# Patient Record
Sex: MALE | Race: WHITE | NOT HISPANIC OR LATINO | Employment: FULL TIME | URBAN - METROPOLITAN AREA
[De-identification: names, ages, dates, MRNs, and addresses within clinical notes are randomized per-mention and may not be internally consistent; named-entity substitution may affect disease eponyms.]

---

## 2017-04-14 ENCOUNTER — OFFICE VISIT (OUTPATIENT)
Dept: FAMILY MEDICINE CLINIC | Facility: CLINIC | Age: 32
End: 2017-04-14

## 2017-04-14 VITALS
TEMPERATURE: 98.2 F | OXYGEN SATURATION: 97 % | HEART RATE: 73 BPM | BODY MASS INDEX: 35.12 KG/M2 | SYSTOLIC BLOOD PRESSURE: 124 MMHG | RESPIRATION RATE: 16 BRPM | WEIGHT: 186 LBS | HEIGHT: 61 IN | DIASTOLIC BLOOD PRESSURE: 92 MMHG

## 2017-04-14 DIAGNOSIS — I10 ESSENTIAL HYPERTENSION: Primary | ICD-10-CM

## 2017-04-14 DIAGNOSIS — Z00.00 LABORATORY EXAMINATION ORDERED AS PART OF A ROUTINE GENERAL MEDICAL EXAMINATION: ICD-10-CM

## 2017-04-14 PROCEDURE — 99213 OFFICE O/P EST LOW 20 MIN: CPT | Performed by: PHYSICIAN ASSISTANT

## 2017-04-14 RX ORDER — RAMIPRIL 5 MG/1
5 CAPSULE ORAL DAILY
Qty: 90 CAPSULE | Refills: 0 | Status: SHIPPED | OUTPATIENT
Start: 2017-04-14 | End: 2017-04-14

## 2017-04-14 RX ORDER — RAMIPRIL 2.5 MG/1
2.5 CAPSULE ORAL DAILY
Qty: 90 CAPSULE | Refills: 0 | Status: SHIPPED | OUTPATIENT
Start: 2017-04-14

## 2017-04-14 NOTE — PROGRESS NOTES
Subjective   Eric Lorenzo is a 31 y.o. male.     History of Present Illness   Eric Lorenzo 31 y.o. male who presents today for routine follow up check and medication refills.  he has a history of   Patient Active Problem List   Diagnosis   • Essential hypertension   .  Since the last visit, he has overall felt tired.  He has been getting elevated BP readings for a month.  Home reading 180/105; 160/101;  .  he has been compliant with current medications have reviewed them.  The patient denies medication side effects.    Both parents have HTN  Was on Ramipril in past and bp came down    Last labs were June.  Lab Results   Component Value Date    GLUCOSE 82 07/02/2014    BUN 11 06/28/2016    CREATININE 1.33 (H) 06/28/2016    EGFRIFNONA 64 06/28/2016    EGFRIFAFRI 78 06/28/2016    BCR 8.3 06/28/2016    K 4.6 06/28/2016    CO2 24.2 06/28/2016    CALCIUM 10.3 06/28/2016    PROTENTOTREF 7.6 06/28/2016    ALBUMIN 4.90 06/28/2016    LABIL2 1.8 06/28/2016    AST 22 06/28/2016    ALT 35 06/28/2016     No results found for: CHOL  Lab Results   Component Value Date    TRIG 239 (H) 06/28/2016     Lab Results   Component Value Date    HDL 24 (L) 06/28/2016     No results found for: LDLCALC  Lab Results   Component Value Date    LDL 82 06/28/2016     No results found for: HDLLDLRATIO  No components found for: CHOLHDL    Having h/a's      Weight is down and trying    The following portions of the patient's history were reviewed and updated as appropriate: allergies, current medications, past family history, past medical history, past social history, past surgical history and problem list.    Review of Systems   Constitutional: Positive for fatigue. Negative for activity change, appetite change and unexpected weight change.   HENT: Negative for nosebleeds and trouble swallowing.    Eyes: Negative for pain and visual disturbance.   Respiratory: Negative for chest tightness, shortness of breath and wheezing.    Cardiovascular:  Negative for chest pain and palpitations.   Gastrointestinal: Negative for abdominal pain and blood in stool.   Endocrine: Negative.    Genitourinary: Negative for difficulty urinating and hematuria.   Musculoskeletal: Negative for joint swelling.   Skin: Negative for color change and rash.   Allergic/Immunologic: Negative.    Neurological: Positive for headaches. Negative for syncope and speech difficulty.   Hematological: Negative for adenopathy.   Psychiatric/Behavioral: Negative for agitation and confusion.   All other systems reviewed and are negative.      Objective   Physical Exam   Constitutional: He is oriented to person, place, and time. He appears well-developed and well-nourished. No distress.   HENT:   Head: Normocephalic and atraumatic.   Eyes: Conjunctivae and EOM are normal. Pupils are equal, round, and reactive to light. Right eye exhibits no discharge. Left eye exhibits no discharge. No scleral icterus.   Neck: Normal range of motion. Neck supple. No tracheal deviation present. No thyromegaly present.   Cardiovascular: Normal rate, regular rhythm, normal heart sounds, intact distal pulses and normal pulses.  Exam reveals no gallop.    No murmur heard.  Pulmonary/Chest: Effort normal and breath sounds normal. No respiratory distress. He has no wheezes. He has no rales.   Musculoskeletal: Normal range of motion.   Neurological: He is alert and oriented to person, place, and time. He exhibits normal muscle tone. Coordination normal.   Skin: Skin is warm. No rash noted. No erythema. No pallor.   Psychiatric: He has a normal mood and affect. His behavior is normal. Judgment and thought content normal.   Nursing note and vitals reviewed.      Assessment/Plan   Problems Addressed this Visit        Cardiovascular and Mediastinum    Essential hypertension - Primary    Relevant Medications    ramipril (ALTACE) 5 MG capsule    Other Relevant Orders    Comprehensive metabolic panel    Lipid panel    CBC and  Differential    TSH    Vitamin D 25 Hydroxy    T4, Free    Urinalysis With Microscopic      Other Visit Diagnoses     Laboratory examination ordered as part of a routine general medical examination        Relevant Orders    Comprehensive metabolic panel    Lipid panel    CBC and Differential    TSH    Vitamin D 25 Hydroxy    T4, Free    Urinalysis With Microscopic

## 2017-04-14 NOTE — PATIENT INSTRUCTIONS
Low glycemic index diet  Exercise 30 minutes most days of the week  Make sure you get results on any labs or tests we ordered today  We discussed medications and how to take them as prescribed  Sleep 6-8 hours each night if possible  If you have not signed up for [facility], please activate your code ASAP from your After Visit Summary today    LDL goal <100  LDL goal if heart disease <70  HDL goal >60  Triglyceride goal <150  BP goal =<130/80  Fasting glucose <100    Start Rx at 5mg and watch for low bp as lose more weight  Update fasting labs    90/60s    appt one month  Need to know if still getting >140/90

## 2017-04-17 DIAGNOSIS — F41.9 ANXIETY: Primary | ICD-10-CM

## 2017-05-25 DIAGNOSIS — I10 ESSENTIAL HYPERTENSION: Primary | ICD-10-CM

## 2017-05-25 DIAGNOSIS — R79.89 CREATININE ELEVATION: ICD-10-CM

## 2017-05-25 LAB
25(OH)D3+25(OH)D2 SERPL-MCNC: 22.8 NG/ML (ref 30–100)
ALBUMIN SERPL-MCNC: 4.7 G/DL (ref 3.5–5.2)
ALBUMIN/GLOB SERPL: 2 G/DL
ALP SERPL-CCNC: 91 U/L (ref 39–117)
ALT SERPL-CCNC: 34 U/L (ref 1–41)
APPEARANCE UR: CLEAR
AST SERPL-CCNC: 23 U/L (ref 1–40)
BACTERIA #/AREA URNS HPF: NORMAL /HPF
BASOPHILS # BLD AUTO: 0.09 10*3/MM3 (ref 0–0.2)
BASOPHILS NFR BLD AUTO: 1 % (ref 0–1.5)
BILIRUB SERPL-MCNC: 0.5 MG/DL (ref 0.1–1.2)
BILIRUB UR QL STRIP: NEGATIVE
BUN SERPL-MCNC: 19 MG/DL (ref 6–20)
BUN/CREAT SERPL: 13.6 (ref 7–25)
CALCIUM SERPL-MCNC: 9.5 MG/DL (ref 8.6–10.5)
CASTS URNS MICRO: NORMAL
CHLORIDE SERPL-SCNC: 102 MMOL/L (ref 98–107)
CHOLEST SERPL-MCNC: 139 MG/DL (ref 0–200)
CO2 SERPL-SCNC: 25.2 MMOL/L (ref 22–29)
COLOR UR: YELLOW
CREAT SERPL-MCNC: 1.4 MG/DL (ref 0.76–1.27)
EOSINOPHIL # BLD AUTO: 0.5 10*3/MM3 (ref 0–0.7)
EOSINOPHIL NFR BLD AUTO: 5.4 % (ref 0.3–6.2)
EPI CELLS #/AREA URNS HPF: NORMAL /HPF
ERYTHROCYTE [DISTWIDTH] IN BLOOD BY AUTOMATED COUNT: 12.5 % (ref 11.5–14.5)
GLOBULIN SER CALC-MCNC: 2.4 GM/DL
GLUCOSE SERPL-MCNC: 88 MG/DL (ref 65–99)
GLUCOSE UR QL: NEGATIVE
HCT VFR BLD AUTO: 42.3 % (ref 40.4–52.2)
HDLC SERPL-MCNC: 23 MG/DL (ref 40–60)
HGB BLD-MCNC: 14.4 G/DL (ref 13.7–17.6)
HGB UR QL STRIP: NEGATIVE
IMM GRANULOCYTES # BLD: 0.02 10*3/MM3 (ref 0–0.03)
IMM GRANULOCYTES NFR BLD: 0.2 % (ref 0–0.5)
KETONES UR QL STRIP: NEGATIVE
LDLC SERPL CALC-MCNC: 45 MG/DL (ref 0–100)
LEUKOCYTE ESTERASE UR QL STRIP: NEGATIVE
LYMPHOCYTES # BLD AUTO: 3.99 10*3/MM3 (ref 0.9–4.8)
LYMPHOCYTES NFR BLD AUTO: 43.4 % (ref 19.6–45.3)
MCH RBC QN AUTO: 33.9 PG (ref 27–32.7)
MCHC RBC AUTO-ENTMCNC: 34 G/DL (ref 32.6–36.4)
MCV RBC AUTO: 99.5 FL (ref 79.8–96.2)
MONOCYTES # BLD AUTO: 0.71 10*3/MM3 (ref 0.2–1.2)
MONOCYTES NFR BLD AUTO: 7.7 % (ref 5–12)
NEUTROPHILS # BLD AUTO: 3.88 10*3/MM3 (ref 1.9–8.1)
NEUTROPHILS NFR BLD AUTO: 42.3 % (ref 42.7–76)
NITRITE UR QL STRIP: NEGATIVE
PH UR STRIP: 6.5 [PH] (ref 5–8)
PLATELET # BLD AUTO: 305 10*3/MM3 (ref 140–500)
POTASSIUM SERPL-SCNC: 4.4 MMOL/L (ref 3.5–5.2)
PROT SERPL-MCNC: 7.1 G/DL (ref 6–8.5)
PROT UR QL STRIP: NEGATIVE
RBC # BLD AUTO: 4.25 10*6/MM3 (ref 4.6–6)
RBC #/AREA URNS HPF: NORMAL /HPF
SODIUM SERPL-SCNC: 139 MMOL/L (ref 136–145)
SP GR UR: 1.02 (ref 1–1.03)
T4 FREE SERPL-MCNC: 1.19 NG/DL (ref 0.93–1.7)
TRIGL SERPL-MCNC: 355 MG/DL (ref 0–150)
TSH SERPL DL<=0.005 MIU/L-ACNC: 1.82 MIU/ML (ref 0.27–4.2)
UROBILINOGEN UR STRIP-MCNC: (no result) MG/DL
VLDLC SERPL CALC-MCNC: 71 MG/DL (ref 5–40)
WBC # BLD AUTO: 9.19 10*3/MM3 (ref 4.5–10.7)
WBC #/AREA URNS HPF: NORMAL /HPF

## 2017-08-25 ENCOUNTER — RESULTS ENCOUNTER (OUTPATIENT)
Dept: FAMILY MEDICINE CLINIC | Facility: CLINIC | Age: 32
End: 2017-08-25

## 2017-08-25 DIAGNOSIS — I10 ESSENTIAL HYPERTENSION: ICD-10-CM

## 2017-08-25 DIAGNOSIS — R79.89 CREATININE ELEVATION: ICD-10-CM

## 2024-11-06 ENCOUNTER — OFFICE VISIT (OUTPATIENT)
Dept: ORTHOPEDIC SURGERY | Facility: CLINIC | Age: 39
End: 2024-11-06
Payer: COMMERCIAL

## 2024-11-06 VITALS
BODY MASS INDEX: 31.5 KG/M2 | WEIGHT: 225 LBS | HEART RATE: 71 BPM | SYSTOLIC BLOOD PRESSURE: 147 MMHG | HEIGHT: 71 IN | DIASTOLIC BLOOD PRESSURE: 96 MMHG

## 2024-11-06 DIAGNOSIS — M25.532 LEFT WRIST PAIN: Primary | ICD-10-CM

## 2024-11-06 RX ORDER — BUPROPION HYDROCHLORIDE 300 MG/1
TABLET ORAL
COMMUNITY
Start: 2024-09-02

## 2024-11-06 RX ORDER — DICLOFENAC SODIUM 75 MG/1
75 TABLET, DELAYED RELEASE ORAL 2 TIMES DAILY
Qty: 30 TABLET | Refills: 0 | Status: SHIPPED | OUTPATIENT
Start: 2024-11-06

## 2024-11-06 NOTE — PROGRESS NOTES
Subjective:     Patient ID: Eric Lorenzo is a 39 y.o. male.    Chief Complaint:  Left upper extremity injury, new patient to examiner  History of Present Illness  History of Present Illness  The patient is a 39-year-old male who presents to the clinic today for evaluation of his wrist pain.    He experienced a wrist injury while chopping wood over the weekend, specifically striking the distal ulnar aspect of his wrist. He has observed significant bruising on both the front and back of his forearm and wrist. He also noticed immediate redness, swelling, and bruising following the incident. Despite the injury, he retains finger mobility and reports no sensations of numbness or tingling.     He describes his pain as stabbing, throbbing, and dull, rating it at 2 to 3 out of 10. The pain intensifies with wrist flexion and there is discomfort during wrist extension. He has attempted to alleviate the pain with ice and anti-inflammatory medication.     He has since purchased a soft wrist immobilizer. He is right-hand dominant. He reports no other concerns at this time.    Social History     Occupational History    Not on file   Tobacco Use    Smoking status: Former     Current packs/day: 1.00     Types: Cigarettes     Passive exposure: Past    Smokeless tobacco: Never   Vaping Use    Vaping status: Former   Substance and Sexual Activity    Alcohol use: Yes     Comment: Rare    Drug use: Never    Sexual activity: Defer      Past Medical History:   Diagnosis Date    HIV (human immunodeficiency virus infection)     Lactose intolerance      Past Surgical History:   Procedure Laterality Date    CHOLECYSTECTOMY         Family History   Problem Relation Age of Onset    Hypertension Mother     Hypertension Father     Colon cancer Maternal Uncle     Colon cancer Maternal Grandmother     Colon cancer Maternal Grandfather     Hyperlipidemia Maternal Grandfather     Colon cancer Paternal Grandmother     Colon cancer Paternal  "Grandfather                Objective:  Physical Exam    Vital signs reviewed.   General: No acute distress.  Eyes: conjunctiva clear; pupils equally round and reactive  ENT: external ears and nose atraumatic; oropharynx clear  CV: no peripheral edema  Resp: normal respiratory effort  Skin: no rashes or wounds; normal turgor  Psych: mood and affect appropriate; recent and remote memory intact    Vitals:    11/06/24 1111   BP: 147/96   Pulse: 71   Weight: 102 kg (225 lb)   Height: 180.3 cm (71\")         11/06/24  1111   Weight: 102 kg (225 lb)     Body mass index is 31.38 kg/m².      Ortho Exam     Physical Exam  Left upper extremity shows scattered ecchymosis along the distal ulna on both the volar and dorsum of the forearm and wrist. All digits of the left hand can flex and extend. Brisk capillary refill to all digits of the left hand. Distal radius pulse is 2 plus. Maximum tenderness is present along the TFCC ligament. There is tenderness upon palpation. Range of motion is decreased with wrist extension and flexion due to pain. Supination and pronation of the wrist are not possible due to pain.    Imaging:    XR Wrist 3+ View Left    Result Date: 11/3/2024   As described.    This report was finalized on 11/3/2024 11:12 AM by Dr. Kalen Solorio M.D on Workstation[facility]     Independently reviewed three-view x-ray imaging left wrist no evidence of acute fracture dislocation or other acute osseous abnormality    Assessment:        1. Left wrist pain         Assessment & Plan  1. Wrist pain.  The pain is rated at 2-3 out of 10, described as stabbing, throbbing, and dull. There is significant bruising along the volar and dorsum of the forearm and wrist, with pain on flexion and discomfort on extension. There is no numbness or tingling. He has tried ice and anti-inflammatory medication.   He is advised to continue using ice. Ibuprofen will be discontinued, and he can start diclofenac to see if it offers greater " symptom improvement. He should continue using the soft wrist immobilizer for immobilization. If there is no improvement, advanced imaging will be considered to evaluate for a ligament tear. He is encouraged to call with any questions or concerns.    Follow-up  Return in 2 weeks for reevaluation.    Orders:  No orders of the defined types were placed in this encounter.    New Medications Ordered This Visit   Medications    diclofenac (VOLTAREN) 75 MG EC tablet     Sig: Take 1 tablet by mouth 2 (Two) Times a Day.     Dispense:  30 tablet     Refill:  0       Dragon dictation utilized  BMI is >= 30 and <35. (Class 1 Obesity). The following options were offered after discussion;: weight loss educational material (shared in after visit summary)       Patient or patient Imaging available for comparisonrepresentative verbalized consent for the use of Ambient Listening during the visit with  ROSIE Vela for chart documentation. 11/6/2024  17:45 EST

## 2024-11-07 ENCOUNTER — PATIENT ROUNDING (BHMG ONLY) (OUTPATIENT)
Dept: ORTHOPEDIC SURGERY | Facility: CLINIC | Age: 39
End: 2024-11-07
Payer: COMMERCIAL

## 2024-11-07 NOTE — PROGRESS NOTES
A My-Chart message has been sent to the patient for PATIENT ROUNDING with [facility].